# Patient Record
Sex: MALE | Race: WHITE | NOT HISPANIC OR LATINO | Employment: FULL TIME | URBAN - METROPOLITAN AREA
[De-identification: names, ages, dates, MRNs, and addresses within clinical notes are randomized per-mention and may not be internally consistent; named-entity substitution may affect disease eponyms.]

---

## 2023-07-19 ENCOUNTER — HOSPITAL ENCOUNTER (EMERGENCY)
Facility: CLINIC | Age: 23
Discharge: HOME | End: 2023-07-19
Attending: EMERGENCY MEDICINE | Admitting: EMERGENCY MEDICINE
Payer: OTHER MISCELLANEOUS

## 2023-07-19 VITALS
HEIGHT: 73 IN | RESPIRATION RATE: 18 BRPM | HEART RATE: 79 BPM | WEIGHT: 205 LBS | TEMPERATURE: 98.3 F | DIASTOLIC BLOOD PRESSURE: 85 MMHG | OXYGEN SATURATION: 97 % | BODY MASS INDEX: 27.17 KG/M2 | SYSTOLIC BLOOD PRESSURE: 146 MMHG

## 2023-07-19 DIAGNOSIS — S61.012A LACERATION OF LEFT THUMB WITHOUT FOREIGN BODY WITHOUT DAMAGE TO NAIL, INITIAL ENCOUNTER: ICD-10-CM

## 2023-07-19 DIAGNOSIS — Z77.21 EXPOSURE TO BLOOD OR BODY FLUID: Primary | ICD-10-CM

## 2023-07-19 LAB
HIV 1+2 AB+HIV1P24 AG SERPLBLD IA.RAPID: NON REACTIVE
HIV 1+2 AB+HIV1P24 AG SERPLBLD IA.RAPID: NON REACTIVE
HIV INTERPRETATION: NORMAL

## 2023-07-19 PROCEDURE — 99283 EMERGENCY DEPT VISIT LOW MDM: CPT | Performed by: EMERGENCY MEDICINE

## 2023-07-19 PROCEDURE — 86706 HEP B SURFACE ANTIBODY: CPT | Performed by: EMERGENCY MEDICINE

## 2023-07-19 PROCEDURE — 36415 COLL VENOUS BLD VENIPUNCTURE: CPT | Performed by: EMERGENCY MEDICINE

## 2023-07-19 PROCEDURE — 87806 HIV AG W/HIV1&2 ANTB W/OPTIC: CPT | Performed by: EMERGENCY MEDICINE

## 2023-07-19 RX ORDER — EMTRICITABINE AND TENOFOVIR DISOPROXIL FUMARATE 200; 300 MG/1; MG/1
1 TABLET, FILM COATED ORAL DAILY
Qty: 14 TABLET | Refills: 0 | Status: SHIPPED | OUTPATIENT
Start: 2023-07-19 | End: 2023-08-02

## 2023-07-19 ASSESSMENT — ACTIVITIES OF DAILY LIVING (ADL)
ADLS_ACUITY_SCORE: 33
ADLS_ACUITY_SCORE: 33

## 2023-07-19 NOTE — ED TRIAGE NOTES
Pt presents with a blood exposure.  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  Pt washed it thoroughly, bandaged it and presented to ED for additional evaluation and treatment.       Triage Assessment     Row Name 07/19/23 7339       Triage Assessment (Adult)    Airway WDL WDL       Respiratory WDL    Respiratory WDL WDL       Skin Circulation/Temperature WDL    Skin Circulation/Temperature WDL WDL       Peripheral/Neurovascular WDL    Peripheral Neurovascular WDL WDL       Cognitive/Neuro/Behavioral WDL    Cognitive/Neuro/Behavioral WDL WDL

## 2023-07-19 NOTE — ED PROVIDER NOTES
"ED Provider Note  [facility]      History     Chief Complaint   Patient presents with     Body Fluid Exposure     HPI  Bayron Macias is a 23 year old male here for body fluid exposure. 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 He states that a beaker containing blood broke and he sustained a cut to his left 1st finger. He washed out the thumb and then came to the ED.     He is UTD on tetanus and other immunizations.    Past Medical History  History reviewed. No pertinent past medical history.  Past Surgical History:   Procedure Laterality Date     DENTAL SURGERY  2010    teeth extraction     DENTAL SURGERY  2014    wisdom teeth      MYRINGOTOMY, INSERT TUBE BILATERAL, COMBINED  2003     IBUPROFEN PO      Allergies   Allergen Reactions     Cephalexin Rash     Family History  Family History   Problem Relation Age of Onset     Diabetes Maternal Grandfather      Hypertension Maternal Grandfather      Prostate Cancer Maternal Grandfather      Arthritis Maternal Grandmother      Cardiovascular Maternal Grandfather      Heart Disease Maternal Grandfather      Lipids Mother      Lipids Maternal Grandfather      Social History   Social History     Tobacco Use     Smoking status: Never     Smokeless tobacco: Never         A medically appropriate review of systems was performed with pertinent positives and negatives noted in the HPI, and all other systems negative.    Physical Exam   BP: (!) 146/85  Pulse: 79  Temp: 98.3  F (36.8  C)  Resp: 18  Height: 185.4 cm (6' 1\")  Weight: 93 kg (205 lb)  SpO2: 97 %  Physical Exam  Constitutional:       General: He is not in acute distress.     Appearance: Normal appearance. He is not toxic-appearing.   HENT:      Head: Normocephalic and atraumatic.   Eyes:      Extraocular Movements: Extraocular movements intact.      Pupils: Pupils are equal, round, and reactive to light.   Cardiovascular:      Rate and Rhythm: Normal rate. "   Pulmonary:      Effort: Pulmonary effort is normal. No respiratory distress.      Breath sounds: No wheezing.   Musculoskeletal:         General: No swelling or deformity. Normal range of motion.      Cervical back: Normal range of motion.   Skin:     General: Skin is warm and dry.      Comments: 2 parallel superficial lacerations ~1cm in length to the medial aspect of the left thumb medial to the nail with bleeding. Wound is well approximated   Neurological:      General: No focal deficit present.      Mental Status: He is alert and oriented to person, place, and time.           ED Course, Procedures, & Data      Procedures                      Results for orders placed or performed during the hospital encounter of 07/19/23   HIV Rapid Antibody Screen     Status: None   Result Value Ref Range    HIV-1/HIV-2 Antibody Non Reactive Non Reactive    HIV1 P24 Antigen Non Reactive Non Reactive    HIV Interpretation      Narrative    All positive rapid HIV 1/2 Ag/Ab samples need to be confirmed by a second HIV 1/2 Ag/Ab Combination test.     Medications   dolutegravir  (TIVICAY) tablet 50 mg - HIV PEP Kit Component (has no administration in time range)   emtricitabine-tenofovir (TRUVADA) - HIV PEP Kit Component (has no administration in time range)     Labs Ordered and Resulted from Time of ED Arrival to Time of ED Departure   HIV RAPID ANTIBODY SCREEN       Result Value    HIV-1/HIV-2 Antibody Non Reactive      HIV1 P24 Antigen Non Reactive      HIV Interpretation       HEPATITIS B SURFACE ANTIBODY     No orders to display          Critical care was not performed.     Medical Decision Making  The patient's presentation was of low complexity (an acute and uncomplicated illness or injury).    The patient's evaluation involved:  history and exam without other MDM data elements    The patient's management necessitated moderate risk (a decision regarding minor procedure (laceration repair) with risk factors of  none).      Assessment & Plan    23yoM here for body fluid exposure. He is up to date on immunizations including tetanus, hepatitis. The patient is an employee of the [email or web address] and was exposed to blood that was donated. Source patient labs could not be obtained here however the patient states that they are going to run source panel testing on another sample tomorrow. Given unknown HIV/hepatitis status of the source PEP will be initiated and the patient will follow up with occupation health.     The patient sustained a laceration to the thumb. The laceration was not deep and was well approximated, appeared superficial. There was bleeding which was controlled with application of surgifoam and a pressure dressing.    The patient was instructed to follow up with occupational health. Return precautions provided, all questions answered.    I have reviewed the nursing notes. I have reviewed the findings, diagnosis, plan and need for follow up with the patient.    New Prescriptions    No medications on file       Final diagnoses:   Exposure to blood or body fluid   Laceration of left thumb without foreign body without damage to nail, initial encounter       Justen PUENTE [facility] EMERGENCY DEPARTMENT  7/19/2023     Justen Chen MD  07/19/23 2041

## 2023-07-20 LAB
HBV SURFACE AB SERPL IA-ACNC: 1.03 M[IU]/ML
HBV SURFACE AB SERPL IA-ACNC: NONREACTIVE M[IU]/ML

## 2023-07-20 NOTE — DISCHARGE INSTRUCTIONS
You should call your occupational health office and schedule an appointment for follow up. They will be able to determine if you need to continue the post-exposure prophylaxis medications or if you can stop taking those based off of the results of the source testing.    Keep your wound dry for the next 24hr. If it starts to bleed apply pressure and if you can not control the bleeding return to the emergency department. If you develop any redness or drainage from the wound you should also be seen.

## 2023-07-21 ENCOUNTER — TELEPHONE (OUTPATIENT)
Dept: EMERGENCY MEDICINE | Facility: CLINIC | Age: 23
End: 2023-07-21
Payer: COMMERCIAL

## 2023-07-21 NOTE — TELEPHONE ENCOUNTER
[facility] Emergency Department/Urgent Care Lab result notification:    Reason for call    Notify the patient/parent of lab results    Assess patient symptoms (if applicable)    Review ED providers recommendations/discharge instructions (if necessary)    Advise per [facility] ED lab result protocol    Lab result  The Hepatitis B surface antibody from a recent exposure is negative (nonreactive).  Recommendations in treatment per [facility] ED lab result Blood and body fluid exposure protocol     [Note: If vaccinated for Hep B (3 shot series) and result Negative, Patient is instructed to followup with PCP clinic within 1 week].   4:48p  Relayed to patient he states he will discuss with his Occupational health department as this was a work site injury.        Flakita Najera RN  [facility] Result RN  [facility] Emergency Dept Lab Result RN  Ph# [phone]

## 2023-09-10 ENCOUNTER — HEALTH MAINTENANCE LETTER (OUTPATIENT)
Age: 23
End: 2023-09-10

## 2024-11-03 ENCOUNTER — HEALTH MAINTENANCE LETTER (OUTPATIENT)
Age: 24
End: 2024-11-03